# Patient Record
Sex: FEMALE | URBAN - METROPOLITAN AREA
[De-identification: names, ages, dates, MRNs, and addresses within clinical notes are randomized per-mention and may not be internally consistent; named-entity substitution may affect disease eponyms.]

---

## 2022-07-29 ENCOUNTER — OUTPATIENT (OUTPATIENT)
Dept: OUTPATIENT SERVICES | Facility: HOSPITAL | Age: 32
LOS: 1 days | End: 2022-07-29
Payer: COMMERCIAL

## 2022-07-29 DIAGNOSIS — Z11.52 ENCOUNTER FOR SCREENING FOR COVID-19: ICD-10-CM

## 2022-07-29 LAB — SARS-COV-2 RNA SPEC QL NAA+PROBE: SIGNIFICANT CHANGE UP

## 2022-07-29 PROCEDURE — U0005: CPT

## 2022-07-29 PROCEDURE — U0003: CPT

## 2022-07-29 PROCEDURE — C9803: CPT

## 2022-07-31 ENCOUNTER — INPATIENT (INPATIENT)
Facility: HOSPITAL | Age: 32
LOS: 1 days | Discharge: HOME | End: 2022-08-02
Attending: STUDENT IN AN ORGANIZED HEALTH CARE EDUCATION/TRAINING PROGRAM | Admitting: STUDENT IN AN ORGANIZED HEALTH CARE EDUCATION/TRAINING PROGRAM
Payer: COMMERCIAL

## 2022-07-31 VITALS — SYSTOLIC BLOOD PRESSURE: 135 MMHG | DIASTOLIC BLOOD PRESSURE: 85 MMHG | TEMPERATURE: 98 F | HEART RATE: 72 BPM

## 2022-07-31 DIAGNOSIS — O48.0 POST-TERM PREGNANCY: ICD-10-CM

## 2022-07-31 LAB
BASOPHILS # BLD AUTO: 0.02 K/UL — SIGNIFICANT CHANGE UP (ref 0–0.2)
BASOPHILS NFR BLD AUTO: 0.3 % — SIGNIFICANT CHANGE UP (ref 0–2)
BLD GP AB SCN SERPL QL: NEGATIVE — SIGNIFICANT CHANGE UP
COVID-19 SPIKE DOMAIN AB INTERP: POSITIVE
COVID-19 SPIKE DOMAIN ANTIBODY RESULT: >250 U/ML — HIGH
EOSINOPHIL # BLD AUTO: 0.1 K/UL — SIGNIFICANT CHANGE UP (ref 0–0.5)
EOSINOPHIL NFR BLD AUTO: 1.3 % — SIGNIFICANT CHANGE UP (ref 0–6)
HCT VFR BLD CALC: 37.5 % — SIGNIFICANT CHANGE UP (ref 34.5–45)
HGB BLD-MCNC: 12.7 G/DL — SIGNIFICANT CHANGE UP (ref 11.5–15.5)
IMM GRANULOCYTES NFR BLD AUTO: 1 % — SIGNIFICANT CHANGE UP (ref 0–1.5)
LYMPHOCYTES # BLD AUTO: 1.35 K/UL — SIGNIFICANT CHANGE UP (ref 1–3.3)
LYMPHOCYTES # BLD AUTO: 16.9 % — SIGNIFICANT CHANGE UP (ref 13–44)
MCHC RBC-ENTMCNC: 30.5 PG — SIGNIFICANT CHANGE UP (ref 27–34)
MCHC RBC-ENTMCNC: 33.9 GM/DL — SIGNIFICANT CHANGE UP (ref 32–36)
MCV RBC AUTO: 90.1 FL — SIGNIFICANT CHANGE UP (ref 80–100)
MONOCYTES # BLD AUTO: 0.68 K/UL — SIGNIFICANT CHANGE UP (ref 0–0.9)
MONOCYTES NFR BLD AUTO: 8.5 % — SIGNIFICANT CHANGE UP (ref 2–14)
NEUTROPHILS # BLD AUTO: 5.74 K/UL — SIGNIFICANT CHANGE UP (ref 1.8–7.4)
NEUTROPHILS NFR BLD AUTO: 72 % — SIGNIFICANT CHANGE UP (ref 43–77)
NRBC # BLD: 0 /100 WBCS — SIGNIFICANT CHANGE UP (ref 0–0)
PLATELET # BLD AUTO: 206 K/UL — SIGNIFICANT CHANGE UP (ref 150–400)
RBC # BLD: 4.16 M/UL — SIGNIFICANT CHANGE UP (ref 3.8–5.2)
RBC # FLD: 13.5 % — SIGNIFICANT CHANGE UP (ref 10.3–14.5)
RH IG SCN BLD-IMP: POSITIVE — SIGNIFICANT CHANGE UP
RH IG SCN BLD-IMP: POSITIVE — SIGNIFICANT CHANGE UP
SARS-COV-2 IGG+IGM SERPL QL IA: >250 U/ML — HIGH
SARS-COV-2 IGG+IGM SERPL QL IA: POSITIVE
WBC # BLD: 7.97 K/UL — SIGNIFICANT CHANGE UP (ref 3.8–10.5)
WBC # FLD AUTO: 7.97 K/UL — SIGNIFICANT CHANGE UP (ref 3.8–10.5)

## 2022-07-31 RX ORDER — CITRIC ACID/SODIUM CITRATE 300-500 MG
15 SOLUTION, ORAL ORAL EVERY 6 HOURS
Refills: 0 | Status: DISCONTINUED | OUTPATIENT
Start: 2022-07-31 | End: 2022-07-31

## 2022-07-31 RX ORDER — AMPICILLIN TRIHYDRATE 250 MG
1 CAPSULE ORAL EVERY 4 HOURS
Refills: 0 | Status: DISCONTINUED | OUTPATIENT
Start: 2022-07-31 | End: 2022-07-31

## 2022-07-31 RX ORDER — SODIUM CHLORIDE 9 MG/ML
300 INJECTION INTRAMUSCULAR; INTRAVENOUS; SUBCUTANEOUS ONCE
Refills: 0 | Status: DISCONTINUED | OUTPATIENT
Start: 2022-07-31 | End: 2022-08-02

## 2022-07-31 RX ORDER — KETOROLAC TROMETHAMINE 30 MG/ML
30 SYRINGE (ML) INJECTION ONCE
Refills: 0 | Status: DISCONTINUED | OUTPATIENT
Start: 2022-07-31 | End: 2022-08-02

## 2022-07-31 RX ORDER — SODIUM CHLORIDE 9 MG/ML
1000 INJECTION, SOLUTION INTRAVENOUS
Refills: 0 | Status: DISCONTINUED | OUTPATIENT
Start: 2022-07-31 | End: 2022-07-31

## 2022-07-31 RX ORDER — DIBUCAINE 1 %
1 OINTMENT (GRAM) RECTAL EVERY 6 HOURS
Refills: 0 | Status: DISCONTINUED | OUTPATIENT
Start: 2022-07-31 | End: 2022-08-02

## 2022-07-31 RX ORDER — CHLORHEXIDINE GLUCONATE 213 G/1000ML
1 SOLUTION TOPICAL ONCE
Refills: 0 | Status: DISCONTINUED | OUTPATIENT
Start: 2022-07-31 | End: 2022-07-31

## 2022-07-31 RX ORDER — BENZOCAINE 10 %
1 GEL (GRAM) MUCOUS MEMBRANE EVERY 6 HOURS
Refills: 0 | Status: DISCONTINUED | OUTPATIENT
Start: 2022-07-31 | End: 2022-08-02

## 2022-07-31 RX ORDER — PRAMOXINE HYDROCHLORIDE 150 MG/15G
1 AEROSOL, FOAM RECTAL EVERY 4 HOURS
Refills: 0 | Status: DISCONTINUED | OUTPATIENT
Start: 2022-07-31 | End: 2022-08-02

## 2022-07-31 RX ORDER — MAGNESIUM HYDROXIDE 400 MG/1
30 TABLET, CHEWABLE ORAL
Refills: 0 | Status: DISCONTINUED | OUTPATIENT
Start: 2022-07-31 | End: 2022-08-02

## 2022-07-31 RX ORDER — OXYTOCIN 10 UNIT/ML
333.33 VIAL (ML) INJECTION
Qty: 20 | Refills: 0 | Status: DISCONTINUED | OUTPATIENT
Start: 2022-07-31 | End: 2022-07-31

## 2022-07-31 RX ORDER — SIMETHICONE 80 MG/1
80 TABLET, CHEWABLE ORAL EVERY 4 HOURS
Refills: 0 | Status: DISCONTINUED | OUTPATIENT
Start: 2022-07-31 | End: 2022-08-02

## 2022-07-31 RX ORDER — HYDROCORTISONE 1 %
1 OINTMENT (GRAM) TOPICAL EVERY 6 HOURS
Refills: 0 | Status: DISCONTINUED | OUTPATIENT
Start: 2022-07-31 | End: 2022-08-02

## 2022-07-31 RX ORDER — SODIUM CHLORIDE 9 MG/ML
3 INJECTION INTRAMUSCULAR; INTRAVENOUS; SUBCUTANEOUS EVERY 8 HOURS
Refills: 0 | Status: DISCONTINUED | OUTPATIENT
Start: 2022-07-31 | End: 2022-08-02

## 2022-07-31 RX ORDER — SODIUM CHLORIDE 9 MG/ML
1000 INJECTION INTRAMUSCULAR; INTRAVENOUS; SUBCUTANEOUS
Refills: 0 | Status: DISCONTINUED | OUTPATIENT
Start: 2022-07-31 | End: 2022-08-02

## 2022-07-31 RX ORDER — ACETAMINOPHEN 500 MG
975 TABLET ORAL
Refills: 0 | Status: DISCONTINUED | OUTPATIENT
Start: 2022-07-31 | End: 2022-08-02

## 2022-07-31 RX ORDER — IBUPROFEN 200 MG
600 TABLET ORAL EVERY 6 HOURS
Refills: 0 | Status: COMPLETED | OUTPATIENT
Start: 2022-07-31 | End: 2023-06-29

## 2022-07-31 RX ORDER — OXYCODONE HYDROCHLORIDE 5 MG/1
5 TABLET ORAL ONCE
Refills: 0 | Status: DISCONTINUED | OUTPATIENT
Start: 2022-07-31 | End: 2022-08-02

## 2022-07-31 RX ORDER — ONDANSETRON 8 MG/1
4 TABLET, FILM COATED ORAL ONCE
Refills: 0 | Status: COMPLETED | OUTPATIENT
Start: 2022-07-31 | End: 2022-07-31

## 2022-07-31 RX ORDER — OXYTOCIN 10 UNIT/ML
4 VIAL (ML) INJECTION
Qty: 30 | Refills: 0 | Status: DISCONTINUED | OUTPATIENT
Start: 2022-07-31 | End: 2022-07-31

## 2022-07-31 RX ORDER — LANOLIN
1 OINTMENT (GRAM) TOPICAL EVERY 6 HOURS
Refills: 0 | Status: DISCONTINUED | OUTPATIENT
Start: 2022-07-31 | End: 2022-08-02

## 2022-07-31 RX ORDER — DIPHENHYDRAMINE HCL 50 MG
25 CAPSULE ORAL EVERY 6 HOURS
Refills: 0 | Status: DISCONTINUED | OUTPATIENT
Start: 2022-07-31 | End: 2022-08-02

## 2022-07-31 RX ORDER — ACETAMINOPHEN 500 MG
1000 TABLET ORAL ONCE
Refills: 0 | Status: COMPLETED | OUTPATIENT
Start: 2022-07-31 | End: 2022-07-31

## 2022-07-31 RX ORDER — OXYTOCIN 10 UNIT/ML
333.33 VIAL (ML) INJECTION
Qty: 20 | Refills: 0 | Status: DISCONTINUED | OUTPATIENT
Start: 2022-07-31 | End: 2022-08-02

## 2022-07-31 RX ORDER — AER TRAVELER 0.5 G/1
1 SOLUTION RECTAL; TOPICAL EVERY 4 HOURS
Refills: 0 | Status: DISCONTINUED | OUTPATIENT
Start: 2022-07-31 | End: 2022-08-02

## 2022-07-31 RX ORDER — AMPICILLIN TRIHYDRATE 250 MG
2 CAPSULE ORAL ONCE
Refills: 0 | Status: COMPLETED | OUTPATIENT
Start: 2022-07-31 | End: 2022-07-31

## 2022-07-31 RX ORDER — OXYCODONE HYDROCHLORIDE 5 MG/1
5 TABLET ORAL
Refills: 0 | Status: DISCONTINUED | OUTPATIENT
Start: 2022-07-31 | End: 2022-08-02

## 2022-07-31 RX ORDER — TETANUS TOXOID, REDUCED DIPHTHERIA TOXOID AND ACELLULAR PERTUSSIS VACCINE, ADSORBED 5; 2.5; 8; 8; 2.5 [IU]/.5ML; [IU]/.5ML; UG/.5ML; UG/.5ML; UG/.5ML
0.5 SUSPENSION INTRAMUSCULAR ONCE
Refills: 0 | Status: DISCONTINUED | OUTPATIENT
Start: 2022-07-31 | End: 2022-08-02

## 2022-07-31 RX ADMIN — Medication 108 GRAM(S): at 18:05

## 2022-07-31 RX ADMIN — SODIUM CHLORIDE 125 MILLILITER(S): 9 INJECTION, SOLUTION INTRAVENOUS at 10:20

## 2022-07-31 RX ADMIN — Medication 0.2 MILLIGRAM(S): at 22:15

## 2022-07-31 RX ADMIN — Medication 108 GRAM(S): at 14:08

## 2022-07-31 RX ADMIN — Medication 400 MILLIGRAM(S): at 23:29

## 2022-07-31 RX ADMIN — ONDANSETRON 4 MILLIGRAM(S): 8 TABLET, FILM COATED ORAL at 19:28

## 2022-07-31 RX ADMIN — Medication 200 GRAM(S): at 10:04

## 2022-07-31 RX ADMIN — Medication 1000 MILLIUNIT(S)/MIN: at 21:46

## 2022-07-31 NOTE — DISCHARGE NOTE OB - CARE PLAN
1 Principal Discharge DX:	Vaginal delivery  Assessment and plan of treatment:	please call if you have fever, heavy vaginal bleeding, pain uncontrolled with pain medicine.

## 2022-07-31 NOTE — OB PROVIDER DELIVERY SUMMARY - NSSELHIDDEN_OBGYN_ALL_OB_FT
[NS_DeliveryAttending1_OBGYN_ALL_OB_FT:[email or web address]=],[NS_DeliveryRN_OBGYN_ALL_OB_FT:[id number]=]

## 2022-07-31 NOTE — DISCHARGE NOTE OB - PATIENT PORTAL LINK FT
You can access the FollowMyHealth Patient Portal offered by [facility] by registering at the following website: [email or web address]. By joining [facility]’s FollowMyHealth portal, you will also be able to view your health information using other applications (apps) compatible with our system.

## 2022-07-31 NOTE — OB PROVIDER H&P - NS ATTEND AMEND GEN_ALL_CORE FT
Patient seen. comfortable with epidural. Here in latent labor. had 3 decelerations which she received terbutaline and since resolved. Pitocin was off. Now restarted after Cat I tracing.   GBS positive  EFW 8-8lb    A/P: P0 here in latent labor  - Continue pitocin  - Will watch tracing and AROM if continues Cat I  - Consents signed. Risks reviewed for vaginal, operative, and  section. I reviewed with her and partner that if the baby is unable to tolerate contractions that would be a reason for  section as she is remote from delivery. Will continue to monitor closely  - Desires  circumcision. Consents signed and risks reviewed including bleeding, infection, damage to penis, need for future surgery.    Joya Flaherty DO

## 2022-07-31 NOTE — DISCHARGE NOTE OB - NS MD DC FALL RISK RISK
For information on Fall & Injury Prevention, visit: [email or web address] OR  [email or web address] OR  https://www.cdc.gov/steadi/patient.html

## 2022-07-31 NOTE — OB PROVIDER LABOR PROGRESS NOTE - NS_SUBJECTIVE/OBJECTIVE_OBGYN_ALL_OB_FT
Patient seen at bedside for 4 min decel, return to baseline and subsequent 1 min decel. Pitocin paused, maternal position changes and resuscitation. Terbutaline x1 dose administered    Vital Signs Last 24 Hrs  T(C): 36.7 (31 Jul 2022 11:49), Max: 36.7 (31 Jul 2022 11:49)  T(F): 98.06 (31 Jul 2022 11:49), Max: 98.06 (31 Jul 2022 11:49)  HR: 57 (31 Jul 2022 13:11) (52 - 93)  BP: 116/75 (31 Jul 2022 13:09) (106/67 - 135/85)  BP(mean): --  RR: 18 (31 Jul 2022 11:49) (18 - 18)  SpO2: 99% (31 Jul 2022 13:11) (94% - 99%)    Parameters below as of 31 Jul 2022 09:12  Patient On (Oxygen Delivery Method): room air
Patient seen for routine cervical exam in the setting of increased rectal pressure.
patient examined due to Cat II tracing. feeling some pressure but comfortable
patient examined to place IUPC for cat II tracing
patient seen and examined. comfortable with epi    AROM clear  Pitocin on 4

## 2022-07-31 NOTE — OB PROVIDER DELIVERY SUMMARY - NSPROVIDERDELIVERYNOTE_OBGYN_ALL_OB_FT
patient fully dilated. bed broken. head, shoulders and body delivered without complication. delayed cord clamping. Pitocin started for active management of third stage. placenta delivered intact. fundus was firm. brisk bleeding noted from left periurethral laceration which was repaired with 2-0 chromic. then brisk bleeding noted from uterus. uterus found to be atonic. methergine given and bimanual exam performed with blood clot removed. no placenta noted on exam. better tone. 2nd degree repaired with 2-0 vicryl rapide. right vaginal laceration repaired with 2-0 chromic. noted to have bleeding coming from uterus again. thorough exam performed with sulci and cervix intact. MELVINA atony noted. cytotec NV given. rectum intact with no suture present. straight cath performed for 300cc urine. improved tone and bleeding improved.     Joya Flaherty DO

## 2022-07-31 NOTE — PRE-ANESTHESIA EVALUATION ADULT - NSANTHOSAYNRD_GEN_A_CORE
No. TERI screening performed.  STOP BANG Legend: 0-2 = LOW Risk; 3-4 = INTERMEDIATE Risk; 5-8 = HIGH Risk

## 2022-07-31 NOTE — DISCHARGE NOTE OB - CARE PROVIDER_API CALL
Joya Flaherty (DO)  [facility] [address], [address]  [place] [address]  Phone: [phone]  Fax: [phone]  Follow Up Time:

## 2022-07-31 NOTE — OB RN DELIVERY SUMMARY - NS_SEPSISRSKCALC_OBGYN_ALL_OB_FT
EOS calculated successfully. EOS Risk Factor: 0.5/1000 live births ([facility] national incidence); GA=40w6d; Temp=98.96; ROM=6.117; GBS='Positive'; Antibiotics='Broad spectrum antibiotics > 4 hrs prior to birth'

## 2022-07-31 NOTE — OB PROVIDER H&P - NSHPPHYSICALEXAM_GEN_ALL_CORE
PE:    T(C): 36.5 (07-31-22 @ 09:12), Max: 36.5 (07-31-22 @ 09:07)  HR: 52 (07-31-22 @ 09:58) (52 - 77)  BP: 135/81 (07-31-22 @ 09:12) (135/81 - 135/85)  RR: 18 (07-31-22 @ 09:12) (18 - 18)  SpO2: 97% (07-31-22 @ 09:58) (94% - 99%)    General: NAD, A&Ox3  CV: RRR  Lungs: Clear bilat   Abd: soft, nontender, gravid  VE: 2.5/70/-3  Bedside sono: vertex  EFM: 135/moderate variablity/+accels/-decels  TOCO: q8mins

## 2022-07-31 NOTE — OB PROVIDER LABOR PROGRESS NOTE - ASSESSMENT
Continue pitocin  Continue to monitor EFM    Joya Flaherty DO 
Patient fully dilated and clinically stable  -Prepare for delivery  -Peanut ball to help station progression    Discussed with Dr. Krysten Valiente PGY 1
Reviewed tracing with patient. overall reassuring with + fetal scalp stim and moderate variability. If tracing becomes more recurrent will do IUPC and amnioinfusion  Patient making change so will continue with pitocin for now    Joya Flaherty DO 
pitocin lowered to 6  will continue to monitor closely   amnioinfusion      Joya Flaherty DO 
30yo  at 40w6d IOL s/p 4 min decel and terb x1    - pitocin paused, maternal resuscitation   - terbutaline x1 administered  - continuous monitoring   - routine labor care    seen with Dr. Bates,    d/w Dr. Reynold Landeros PGY3

## 2022-07-31 NOTE — OB PROVIDER LABOR PROGRESS NOTE - NS_OBIHIFHRDETAILS_OBGYN_ALL_OB_FT
Cat I
145  Moderate  Accels  -decels  Cat 1
Cat II intermittent late/variable decelerations. IUPC placed.
Cat II with moderate variability, + accels and fetal scalp stimulation and intermittent variable decels
145, mod, +accel, s/p 4 min decel

## 2022-07-31 NOTE — PRE-ANESTHESIA EVALUATION ADULT - NSANTHPMHFT_GEN_ALL_CORE
31F 1st baby  Denies history of back pain, neurological deficits, or bleeding disorders.     On aspirin  Denies antiplatelet or anticoagulation

## 2022-07-31 NOTE — OB PROVIDER H&P - ASSESSMENT
A/P: 30yo G1 @ 40w6d here in early labor for epidural and augmentation  - Admit to L&D  - Routine labs   - EFM/TOCO: resuscitative measures prn  - Anesthesia consult for epidural   - Augment with Pitocin  - Expect     d/w Dr. Reynold Craft, PA-C

## 2022-07-31 NOTE — DISCHARGE NOTE OB - MEDICATION SUMMARY - MEDICATIONS TO TAKE
I will START or STAY ON the medications listed below when I get home from the hospital:    acetaminophen 325 mg oral tablet  -- 3 tab(s) by mouth every 6 hours  -- Indication: For Pain    ibuprofen 600 mg oral tablet  -- 1 tab(s) by mouth every 6 hours  -- Indication: For Pain    Prenatal Multivitamins with Folic Acid 1 mg oral tablet  -- 1 tab(s) by mouth once a day  -- Indication: For Vitamin

## 2022-07-31 NOTE — OB RN DELIVERY SUMMARY - NSSELHIDDEN_OBGYN_ALL_OB_FT
[NS_DeliveryAttending1_OBGYN_ALL_OB_FT:[email or web address]=],[NS_DeliveryRN_OBGYN_ALL_OB_FT:[id number]=]

## 2022-07-31 NOTE — OB PROVIDER H&P - HISTORY OF PRESENT ILLNESS
Pt is a 30yo G1 @ 40w6d here for LT IOL in early labor. Pt reports painful ctx since 8am, 9/10. Pt. denies LOF, VB. +FM. PNC uncomplicated GBS (+) EFW 3200g    OBHx: 1st preg  GYNHx: denies ovarian cysts, fibroids, hx of abnormal pap or STDs  PMHx: denies  PSurgHx: 2021 lap gerardo  Allergies: NKDA  Meds: PNV, ASA  Social: No smoking, alcohol, or illicit drug use  Psych: No hx of anxiety or depression

## 2022-08-01 LAB
HCT VFR BLD CALC: 31.4 % — LOW (ref 34.5–45)
HGB BLD-MCNC: 10.9 G/DL — LOW (ref 11.5–15.5)
T PALLIDUM AB TITR SER: NEGATIVE — SIGNIFICANT CHANGE UP

## 2022-08-01 RX ORDER — IBUPROFEN 200 MG
600 TABLET ORAL EVERY 6 HOURS
Refills: 0 | Status: DISCONTINUED | OUTPATIENT
Start: 2022-08-01 | End: 2022-08-02

## 2022-08-01 RX ADMIN — Medication 600 MILLIGRAM(S): at 18:11

## 2022-08-01 RX ADMIN — Medication 975 MILLIGRAM(S): at 20:20

## 2022-08-01 RX ADMIN — Medication 600 MILLIGRAM(S): at 09:46

## 2022-08-01 RX ADMIN — Medication 1 TABLET(S): at 15:34

## 2022-08-01 RX ADMIN — Medication 600 MILLIGRAM(S): at 10:30

## 2022-08-01 RX ADMIN — Medication 975 MILLIGRAM(S): at 15:33

## 2022-08-01 RX ADMIN — Medication 975 MILLIGRAM(S): at 07:30

## 2022-08-01 RX ADMIN — Medication 975 MILLIGRAM(S): at 16:15

## 2022-08-01 RX ADMIN — Medication 975 MILLIGRAM(S): at 06:48

## 2022-08-01 RX ADMIN — SODIUM CHLORIDE 3 MILLILITER(S): 9 INJECTION INTRAMUSCULAR; INTRAVENOUS; SUBCUTANEOUS at 14:08

## 2022-08-02 VITALS
TEMPERATURE: 98 F | SYSTOLIC BLOOD PRESSURE: 108 MMHG | DIASTOLIC BLOOD PRESSURE: 70 MMHG | OXYGEN SATURATION: 97 % | RESPIRATION RATE: 17 BRPM | HEART RATE: 60 BPM

## 2022-08-02 PROCEDURE — 86900 BLOOD TYPING SEROLOGIC ABO: CPT

## 2022-08-02 PROCEDURE — 59050 FETAL MONITOR W/REPORT: CPT

## 2022-08-02 PROCEDURE — 85014 HEMATOCRIT: CPT

## 2022-08-02 PROCEDURE — 36415 COLL VENOUS BLD VENIPUNCTURE: CPT

## 2022-08-02 PROCEDURE — 86901 BLOOD TYPING SEROLOGIC RH(D): CPT

## 2022-08-02 PROCEDURE — 86769 SARS-COV-2 COVID-19 ANTIBODY: CPT

## 2022-08-02 PROCEDURE — 86850 RBC ANTIBODY SCREEN: CPT

## 2022-08-02 PROCEDURE — 86780 TREPONEMA PALLIDUM: CPT

## 2022-08-02 PROCEDURE — 85018 HEMOGLOBIN: CPT

## 2022-08-02 PROCEDURE — 85025 COMPLETE CBC W/AUTO DIFF WBC: CPT

## 2022-08-02 PROCEDURE — 59025 FETAL NON-STRESS TEST: CPT

## 2022-08-02 RX ORDER — ACETAMINOPHEN 500 MG
3 TABLET ORAL
Qty: 0 | Refills: 0 | DISCHARGE
Start: 2022-08-02

## 2022-08-02 RX ORDER — IBUPROFEN 200 MG
1 TABLET ORAL
Qty: 0 | Refills: 0 | DISCHARGE
Start: 2022-08-02

## 2022-08-02 RX ADMIN — Medication 600 MILLIGRAM(S): at 00:06

## 2022-08-02 RX ADMIN — Medication 600 MILLIGRAM(S): at 05:48

## 2022-08-02 RX ADMIN — Medication 1 TABLET(S): at 12:45

## 2022-08-02 RX ADMIN — Medication 975 MILLIGRAM(S): at 02:47

## 2022-08-02 RX ADMIN — Medication 600 MILLIGRAM(S): at 12:45

## 2022-08-02 RX ADMIN — Medication 975 MILLIGRAM(S): at 09:57

## 2022-08-02 RX ADMIN — Medication 975 MILLIGRAM(S): at 09:19

## 2022-08-02 NOTE — PROGRESS NOTE ADULT - SUBJECTIVE AND OBJECTIVE BOX
PPD#1- ATTENDING NOTE    S: Patient doing well. Minimal lochia. Pain controlled.    O: Vital Signs Last 24 Hrs  T(C): 37.1 (01 Aug 2022 05:22), Max: 37.6 (2022 22:45)  T(F): 98.7 (01 Aug 2022 05:22), Max: 99.7 (2022 22:45)  HR: 83 (01 Aug 2022 05:22) (52 - 133)  BP: 112/68 (01 Aug 2022 05:22) (106/67 - 138/65)  BP(mean): 80 (01 Aug 2022 00:15) (80 - 90)  RR: 17 (01 Aug 2022 05:) (17 - 20)  SpO2: 97% (01 Aug 2022 05:) (78% - 100%)    Parameters below as of 01 Aug 2022 05:22  Patient On (Oxygen Delivery Method): room air        Gen: NAD  Abd: soft, NT, ND, fundus firm below umbilicus  Lochia: moderate  Ext: no tenderness, no hyper reflexia  Voiding well    Labs:                        12.7   7.97  )-----------( 206      ( 2022 10:19 )             37.5     ABO Interpretation: A ( @ 12:38)  Rh Interpretation: Positive ( @ 12:38)  ABO Interpretation: A ( @ 12:37)  Rh Interpretation: Positive ( @ 12:37)    Antibody Screen Negative    A: 31y PPD# s/p  doing well.    Plan:  Analgesia prn  Regular diet        Office [phone]  Dr. Neri    
OB Progress Note    S: Patient seen and examined at bedside. Pain well controlled, tolerating regular diet, passing flatus, ambulating without difficulty, voiding spontaneously. Denies heavy vaginal bleeding, N/V, CP/SOB/lightheadedness/dizziness, headache, visual disturbances, epigastric pain.     O:   Vital Signs Last 24 Hrs  T(C): 36.6 (02 Aug 2022 05:20), Max: 36.9 (01 Aug 2022 17:30)  T(F): 97.9 (02 Aug 2022 05:20), Max: 98.4 (01 Aug 2022 17:30)  HR: 60 (02 Aug 2022 05:20) (60 - 78)  BP: 108/70 (02 Aug 2022 05:20) (104/68 - 108/70)  BP(mean): --  RR: 17 (02 Aug 2022 05:20) (17 - 18)  SpO2: 97% (02 Aug 2022 05:20) (97% - 98%)    Parameters below as of 02 Aug 2022 05:20  Patient On (Oxygen Delivery Method): room air        Labs:  Blood type: A Positive  Rubella IgG: RPR: Negative                          10.9<L>   -- >-----------< --    ( 08-01 @ 06:37 )             31.4<L>                        12.7   7.97 >-----------< 206    ( 07-31 @ 10:19 )             37.5                  PE:  General: NAD  Abdomen: soft, nontender, nondistended, fundus firm below umbilicus  : No heavy vaginal bleeding  Extremities: No erythema, NT        A/P: PPD2 doing well. meeting post op milestones  - rh positive  - s/p tdap  - MMR/VI  discharged home with precautions, for pp visit  6 wks